# Patient Record
Sex: FEMALE | Race: WHITE | NOT HISPANIC OR LATINO
[De-identification: names, ages, dates, MRNs, and addresses within clinical notes are randomized per-mention and may not be internally consistent; named-entity substitution may affect disease eponyms.]

---

## 2018-03-27 ENCOUNTER — APPOINTMENT (OUTPATIENT)
Dept: HEART AND VASCULAR | Facility: CLINIC | Age: 68
End: 2018-03-27
Payer: MEDICARE

## 2018-03-27 VITALS
DIASTOLIC BLOOD PRESSURE: 78 MMHG | HEART RATE: 77 BPM | OXYGEN SATURATION: 97 % | TEMPERATURE: 97.7 F | HEIGHT: 65 IN | WEIGHT: 148 LBS | BODY MASS INDEX: 24.66 KG/M2 | RESPIRATION RATE: 12 BRPM | SYSTOLIC BLOOD PRESSURE: 114 MMHG

## 2018-03-27 DIAGNOSIS — N95.9 UNSPECIFIED MENOPAUSAL AND PERIMENOPAUSAL DISORDER: ICD-10-CM

## 2018-03-27 DIAGNOSIS — E78.5 HYPERLIPIDEMIA, UNSPECIFIED: ICD-10-CM

## 2018-03-27 DIAGNOSIS — R94.31 ABNORMAL ELECTROCARDIOGRAM [ECG] [EKG]: ICD-10-CM

## 2018-03-27 DIAGNOSIS — Z86.69 PERSONAL HISTORY OF OTHER DISEASES OF THE NERVOUS SYSTEM AND SENSE ORGANS: ICD-10-CM

## 2018-03-27 DIAGNOSIS — I70.90 UNSPECIFIED ATHEROSCLEROSIS: ICD-10-CM

## 2018-03-27 PROCEDURE — 93351 STRESS TTE COMPLETE: CPT

## 2018-03-27 PROCEDURE — 99203 OFFICE O/P NEW LOW 30 MIN: CPT | Mod: 25

## 2018-03-27 PROCEDURE — 93325 DOPPLER ECHO COLOR FLOW MAPG: CPT

## 2018-03-27 PROCEDURE — 93320 DOPPLER ECHO COMPLETE: CPT

## 2018-03-28 PROBLEM — R94.31 ABNORMAL EKG: Status: ACTIVE | Noted: 2018-03-28

## 2018-03-28 PROBLEM — E78.5 DYSLIPIDEMIA: Status: ACTIVE | Noted: 2018-03-28

## 2018-03-28 PROBLEM — N95.9 POSTMENOPAUSAL SYMPTOMS: Status: RESOLVED | Noted: 2018-03-28 | Resolved: 2018-03-28

## 2018-03-28 PROBLEM — I70.90 ATHEROSCLEROSIS: Status: ACTIVE | Noted: 2018-03-28

## 2018-03-28 PROBLEM — Z86.69 HISTORY OF MIGRAINE: Status: RESOLVED | Noted: 2018-03-28 | Resolved: 2018-03-28

## 2018-11-01 ENCOUNTER — OUTPATIENT (OUTPATIENT)
Dept: OUTPATIENT SERVICES | Facility: HOSPITAL | Age: 68
LOS: 1 days | Discharge: HOME | End: 2018-11-01

## 2019-01-17 ENCOUNTER — OUTPATIENT (OUTPATIENT)
Dept: OUTPATIENT SERVICES | Facility: HOSPITAL | Age: 69
LOS: 1 days | Discharge: HOME | End: 2019-01-17

## 2020-06-18 ENCOUNTER — APPOINTMENT (OUTPATIENT)
Dept: OTOLARYNGOLOGY | Facility: CLINIC | Age: 70
End: 2020-06-18
Payer: MEDICARE

## 2020-06-18 VITALS
HEART RATE: 76 BPM | DIASTOLIC BLOOD PRESSURE: 78 MMHG | WEIGHT: 154.2 LBS | BODY MASS INDEX: 25.38 KG/M2 | TEMPERATURE: 95.7 F | HEIGHT: 65.5 IN | SYSTOLIC BLOOD PRESSURE: 124 MMHG

## 2020-06-18 DIAGNOSIS — R06.00 DYSPNEA, UNSPECIFIED: ICD-10-CM

## 2020-06-18 DIAGNOSIS — Z87.19 PERSONAL HISTORY OF OTHER DISEASES OF THE DIGESTIVE SYSTEM: ICD-10-CM

## 2020-06-18 DIAGNOSIS — Z86.79 PERSONAL HISTORY OF OTHER DISEASES OF THE CIRCULATORY SYSTEM: ICD-10-CM

## 2020-06-18 DIAGNOSIS — Z85.828 PERSONAL HISTORY OF OTHER MALIGNANT NEOPLASM OF SKIN: ICD-10-CM

## 2020-06-18 PROCEDURE — 69210 REMOVE IMPACTED EAR WAX UNI: CPT

## 2020-06-18 PROCEDURE — 99203 OFFICE O/P NEW LOW 30 MIN: CPT | Mod: 25

## 2020-06-21 PROBLEM — Z86.79 HISTORY OF ABNORMAL ELECTROCARDIOGRAPHY: Status: RESOLVED | Noted: 2018-03-28 | Resolved: 2020-06-21

## 2020-06-21 PROBLEM — Z87.19 HISTORY OF HEMORRHOIDS: Status: RESOLVED | Noted: 2020-06-21 | Resolved: 2020-06-21

## 2020-06-21 PROBLEM — Z85.828 HISTORY OF BASAL CELL CARCINOMA: Status: RESOLVED | Noted: 2020-06-21 | Resolved: 2020-06-21

## 2020-06-21 PROBLEM — R06.00 DYSPNEA ON EXERTION: Status: RESOLVED | Noted: 2018-03-28 | Resolved: 2020-06-21

## 2020-06-21 RX ORDER — UBIQUINOL 100 MG
CAPSULE ORAL
Refills: 0 | Status: ACTIVE | COMMUNITY

## 2020-06-21 RX ORDER — LUTEIN 6 MG
TABLET ORAL
Refills: 0 | Status: ACTIVE | COMMUNITY

## 2020-06-21 RX ORDER — PSYLLIUM HUSK 0.4 G
CAPSULE ORAL
Refills: 0 | Status: ACTIVE | COMMUNITY

## 2020-06-21 RX ORDER — CHROMIUM 200 MCG
TABLET ORAL
Refills: 0 | Status: ACTIVE | COMMUNITY

## 2020-06-22 NOTE — DATA REVIEWED
[de-identified] : \par AUDIOGRAM (02/26/2020)\par RIGHT:  Mild to moderately severe SNHL in mid-high frequencies\par LEFT:   Mild to severe  SNHL  in mid-high frequencies\par Word recognition 100% on right; 02% on left\par \par OAEs:  present            absent         Right        Left

## 2020-06-22 NOTE — HISTORY OF PRESENT ILLNESS
[de-identified] : Ms. Lemus is a 70 year old woman who was referred by Dr. Jensen for attention to the ears.\par She was accompanied by her , who also contributed to history. \par \par She is concerned for cerumen in ears due to clogging sensation and problem with hearing aids.\par She obtained hearing aids in February at [facility] and is satisfied with use at this time.  However, the domes keep coming off when she takes the hearing aids out.  She thinks something may be in her ears.\par No tinnitus, vertigo, ear pain or ear d/c.\par \par

## 2020-06-22 NOTE — CONSULT LETTER
[Dear  ___] : Dear  [unfilled], [( Thank you for referring [unfilled] for consultation for _____ )] : Thank you for referring [unfilled] for consultation for [unfilled] [Please see my note below.] : Please see my note below. [Consult Closing:] : Thank you very much for allowing me to participate in the care of this patient.  If you have any questions, please do not hesitate to contact me. [Sincerely,] : Sincerely, [FreeTextEntry2] : Rakesh Jensen MD\par [address], [address]\par [place] [id number] [FreeTextEntry3] : \par Becky Landa MD \par Otolaryngology, Head and Neck Surgery \par \par   [FreeTextEntry1] : \par Enclosed please find my office notes for June 18, 2020. \par

## 2020-06-22 NOTE — REVIEW OF SYSTEMS
[Patient Intake Form Reviewed] : Patient intake form was reviewed [Seasonal Allergies] : seasonal allergies [As Noted in HPI] : as noted in HPI [Joint Pain] : joint pain [Negative] : Heme/Lymph [de-identified] : dust, cat [FreeTextEntry2] : night sseats [FreeTextEntry9] : back pain

## 2020-06-22 NOTE — PHYSICAL EXAM
[Midline] : trachea located in midline position [Removed] : palatine tonsils previously removed [Normal] : no rashes [de-identified] : Excess cerumen removed with curette bilateral.  No erythema, exudate or FB noted. [de-identified] : S/p Nasal tip reconstruction w flap.  no lesion. [de-identified] : Carotid pulses 2+ bilateral.

## 2020-06-22 NOTE — ASSESSMENT
[FreeTextEntry1] : Ms. Lemus had ear clogging, relieved with cerumen removal bilateral.\par No FB was noted in either ear.\par I suggested that she speak with her audiologist re the domes, which may need to be changed to different size/style.\par \par Return in 1 year or PRN.\par

## 2021-03-31 ENCOUNTER — APPOINTMENT (OUTPATIENT)
Dept: OTOLARYNGOLOGY | Facility: CLINIC | Age: 71
End: 2021-03-31
Payer: MEDICARE

## 2021-03-31 VITALS
SYSTOLIC BLOOD PRESSURE: 137 MMHG | DIASTOLIC BLOOD PRESSURE: 73 MMHG | HEIGHT: 65.5 IN | TEMPERATURE: 96.2 F | BODY MASS INDEX: 25.02 KG/M2 | HEART RATE: 80 BPM | WEIGHT: 152 LBS

## 2021-03-31 DIAGNOSIS — H93.8X3 OTHER SPECIFIED DISORDERS OF EAR, BILATERAL: ICD-10-CM

## 2021-03-31 PROCEDURE — 69200 CLEAR OUTER EAR CANAL: CPT | Mod: LT

## 2021-03-31 PROCEDURE — 99213 OFFICE O/P EST LOW 20 MIN: CPT | Mod: 25

## 2021-03-31 NOTE — HISTORY OF PRESENT ILLNESS
[de-identified] : Ms. Lemus was seen in f/up for hearing and wax.\par She was accompanied by her . \par \par About 4 weeks ago, 3 "sizeable globs" of wax came out of right ear.\par No ear discomfort or problem with her hearing aids.\par No tinnitus, vertigo.\par \par \par S/p 2 shots COVIC vaccine over 2 weeks ago.

## 2021-03-31 NOTE — CONSULT LETTER
[Dear  ___] : Dear  [unfilled], [Courtesy Letter:] : I had the pleasure of seeing your patient, [unfilled], in my office today. [Please see my note below.] : Please see my note below. [Consult Closing:] : Thank you very much for allowing me to participate in the care of this patient.  If you have any questions, please do not hesitate to contact me. [Sincerely,] : Sincerely, [FreeTextEntry2] : Rakesh Jensen MD\par [address], [address]\par [place] [id number] [FreeTextEntry3] : \par Becky Landa MD \par Otolaryngology, Head and Neck Surgery \par \par

## 2021-03-31 NOTE — PHYSICAL EXAM
[de-identified] : Right excess cerumen removed.  Left EAC collapsing canal; cerumen and FB (small dome for hearing aid) were removed with curette. [de-identified] : S/p Nasal tip reconstruction w flap.  no lesion. [Midline] : trachea located in midline position [Removed] : palatine tonsils previously removed [Normal] : no neck adenopathy

## 2021-03-31 NOTE — DATA REVIEWED
[de-identified] : \par AUDIOGRAM (02/26/2020)\par RIGHT:  Mild to moderately severe SNHL in mid-high frequencies\par LEFT:   Mild to severe  SNHL  in mid-high frequencies\par Word recognition 100% on right; 02% on left\par

## 2021-03-31 NOTE — ASSESSMENT
[FreeTextEntry1] : Ms. Lemus had cerumen removal bilaterally.\par A FB (small dome for her hearing aid) was removed from left EAC.  \par She feels that hearing is better now.\par \par She is to look at the hearing aids when she takes them out - check that the domes are attached.\par \par Return in 6-9 months\par

## 2021-10-05 ENCOUNTER — APPOINTMENT (OUTPATIENT)
Dept: OTOLARYNGOLOGY | Facility: HOSPITAL | Age: 71
End: 2021-10-05

## 2021-11-10 ENCOUNTER — APPOINTMENT (OUTPATIENT)
Dept: OTOLARYNGOLOGY | Facility: CLINIC | Age: 71
End: 2021-11-10
Payer: MEDICARE

## 2021-11-10 VITALS
HEART RATE: 78 BPM | BODY MASS INDEX: 25.33 KG/M2 | SYSTOLIC BLOOD PRESSURE: 138 MMHG | TEMPERATURE: 97 F | DIASTOLIC BLOOD PRESSURE: 62 MMHG | HEIGHT: 65 IN | WEIGHT: 152 LBS

## 2021-11-10 DIAGNOSIS — Z92.29 PERSONAL HISTORY OF OTHER DRUG THERAPY: ICD-10-CM

## 2021-11-10 DIAGNOSIS — T16.2XXA FOREIGN BODY IN LEFT EAR, INITIAL ENCOUNTER: ICD-10-CM

## 2021-11-10 PROCEDURE — 99213 OFFICE O/P EST LOW 20 MIN: CPT | Mod: 25

## 2021-11-10 PROCEDURE — 69210 REMOVE IMPACTED EAR WAX UNI: CPT

## 2021-12-29 PROBLEM — T16.2XXA EAR FOREIGN BODY, LEFT, INITIAL ENCOUNTER: Status: RESOLVED | Noted: 2021-03-31 | Resolved: 2021-12-29

## 2021-12-29 NOTE — CONSULT LETTER
[Dear  ___] : Dear  [unfilled], [Courtesy Letter:] : I had the pleasure of seeing your patient, [unfilled], in my office today. [Please see my note below.] : Please see my note below. [Consult Closing:] : Thank you very much for allowing me to participate in the care of this patient.  If you have any questions, please do not hesitate to contact me. [Sincerely,] : Sincerely, [FreeTextEntry2] : Rakesh Jensen MD\par [address], [address]\par [place] [phone] [FreeTextEntry3] : \par Becky Landa MD \par Otolaryngology, Head and Neck Surgery \par \par

## 2021-12-29 NOTE — ASSESSMENT
[FreeTextEntry1] : Ms. Lemus had relief of ear clogging afer cerumen removal bilaterally.\par No FB was seen in either ear.\par She feels that hearing is better now.\par \par Return in 6-9 months\par

## 2021-12-29 NOTE — HISTORY OF PRESENT ILLNESS
[de-identified] : Ms. Lemus was seen in f/up for hearing loss and wax.\par She was accompanied by her . \par \par One of her hearing aids is being repaired. She lost the dome from her left ear and is concerned that it remains in her ear., as has happened in the past.\par No ear pain or drainage.\par \par VISIT 3/31/2021\par About 4 weeks ago, 3 "sizeable globs" of wax came out of right ear.\par No ear discomfort or problem with her hearing aids.\par No tinnitus, vertigo.\par \par

## 2021-12-29 NOTE — PHYSICAL EXAM
[Midline] : trachea located in midline position [Removed] : palatine tonsils previously removed [FreeTextEntry1] : No hoarseness.  [de-identified] : Bilateral cerumen impactions were removed with curette.  NO FB seen in either ear. [de-identified] : S/p Nasal dome reconstruction w flap.  No lesion. [Normal] : no neck adenopathy

## 2021-12-29 NOTE — DATA REVIEWED
[de-identified] : \par AUDIOGRAM (02/26/2020) at [facility]\par RIGHT:  Mild to moderately severe SNHL in mid-high frequencies\par LEFT:   Mild to severe  SNHL  in mid-high frequencies\par Word recognition 100% on right; 92% on left\par

## 2022-07-20 ENCOUNTER — APPOINTMENT (OUTPATIENT)
Dept: OTOLARYNGOLOGY | Facility: CLINIC | Age: 72
End: 2022-07-20

## 2022-07-20 VITALS
HEART RATE: 70 BPM | TEMPERATURE: 97 F | BODY MASS INDEX: 23.3 KG/M2 | SYSTOLIC BLOOD PRESSURE: 128 MMHG | WEIGHT: 145 LBS | DIASTOLIC BLOOD PRESSURE: 88 MMHG | HEIGHT: 66 IN

## 2022-07-20 DIAGNOSIS — H93.12 TINNITUS, LEFT EAR: ICD-10-CM

## 2022-07-20 DIAGNOSIS — H61.23 IMPACTED CERUMEN, BILATERAL: ICD-10-CM

## 2022-07-20 PROCEDURE — 69210 REMOVE IMPACTED EAR WAX UNI: CPT | Mod: LT

## 2022-07-20 PROCEDURE — 99213 OFFICE O/P EST LOW 20 MIN: CPT | Mod: 25

## 2022-07-26 PROBLEM — H61.23 EXCESSIVE CERUMEN IN BOTH EAR CANALS: Status: RESOLVED | Noted: 2020-06-22 | Resolved: 2022-07-26

## 2022-07-26 PROBLEM — H93.12 TINNITUS, LEFT: Status: ACTIVE | Noted: 2022-07-26

## 2022-07-26 NOTE — HISTORY OF PRESENT ILLNESS
[de-identified] : Ms. Lemus feels both of her ears (L>R) are clogged. \par The left ear is giving a buzzing noise that she thinks is due to the wax.\par No ear pain or drainage.  No vertigo.\par Wearing hearing aids and happy with them\par \par VISIT 11/10/2021\par One of her hearing aids is being repaired. She lost the dome from her left ear and is concerned that it remains in her ear., as has happened in the past.\par No ear pain or drainage.\par \par VISIT 3/31/2021\par About 4 weeks ago, 3 "sizeable globs" of wax came out of right ear.\par No ear discomfort or problem with her hearing aids.\par No tinnitus, vertigo.\par

## 2022-07-26 NOTE — CONSULT LETTER
[Dear  ___] : Dear  [unfilled], [Courtesy Letter:] : I had the pleasure of seeing your patient, [unfilled], in my office today. [Please see my note below.] : Please see my note below. [Consult Closing:] : Thank you very much for allowing me to participate in the care of this patient.  If you have any questions, please do not hesitate to contact me. [Sincerely,] : Sincerely, [FreeTextEntry3] : \par Becky Landa MD \par Otolaryngology, Head and Neck Surgery \par \par   [FreeTextEntry2] : Rakesh Jensen MD\par [address], [address]\par [place] [id number]

## 2022-07-26 NOTE — PHYSICAL EXAM
[Midline] : trachea located in midline position [Removed] : palatine tonsils previously removed [Normal] : no neck adenopathy [FreeTextEntry1] : No hoarseness.  [de-identified] : Left EAC with cerumen impaction, removed with curette and suction after H2O2 instillation. Right EAC clear.\par \par   [de-identified] : S/p nasal dome reconstruction w flap; wellhealed.  No lesion noted on skin.

## 2022-07-26 NOTE — ASSESSMENT
[FreeTextEntry1] : Ms. Lemus had relief of ear clogging and tinnitus after left cerumen impaction was removed.\par She should clean the hearing aid of wax regularly\par \par Return in 6-9 months

## 2023-02-09 ENCOUNTER — APPOINTMENT (OUTPATIENT)
Dept: OTOLARYNGOLOGY | Facility: CLINIC | Age: 73
End: 2023-02-09
Payer: MEDICARE

## 2023-02-09 VITALS
HEIGHT: 66 IN | SYSTOLIC BLOOD PRESSURE: 140 MMHG | HEART RATE: 99 BPM | DIASTOLIC BLOOD PRESSURE: 80 MMHG | WEIGHT: 159.2 LBS | BODY MASS INDEX: 25.58 KG/M2 | TEMPERATURE: 96.3 F

## 2023-02-09 DIAGNOSIS — Z97.4 PRESENCE OF EXTERNAL HEARING-AID: ICD-10-CM

## 2023-02-09 PROCEDURE — 69210 REMOVE IMPACTED EAR WAX UNI: CPT

## 2023-02-09 PROCEDURE — 99213 OFFICE O/P EST LOW 20 MIN: CPT | Mod: 25

## 2023-02-17 ENCOUNTER — OFFICE (OUTPATIENT)
Dept: URBAN - METROPOLITAN AREA CLINIC 28 | Facility: CLINIC | Age: 73
Setting detail: OPHTHALMOLOGY
End: 2023-02-17
Payer: MEDICARE

## 2023-02-17 DIAGNOSIS — H01.001: ICD-10-CM

## 2023-02-17 DIAGNOSIS — H40.1131: ICD-10-CM

## 2023-02-17 DIAGNOSIS — H01.005: ICD-10-CM

## 2023-02-17 DIAGNOSIS — H35.3132: ICD-10-CM

## 2023-02-17 DIAGNOSIS — H01.004: ICD-10-CM

## 2023-02-17 DIAGNOSIS — H02.89: ICD-10-CM

## 2023-02-17 DIAGNOSIS — H43.393: ICD-10-CM

## 2023-02-17 DIAGNOSIS — H01.002: ICD-10-CM

## 2023-02-17 DIAGNOSIS — H26.493: ICD-10-CM

## 2023-02-17 PROCEDURE — 92250 FUNDUS PHOTOGRAPHY W/I&R: CPT | Performed by: OPHTHALMOLOGY

## 2023-02-17 PROCEDURE — 92012 INTRM OPH EXAM EST PATIENT: CPT | Performed by: OPHTHALMOLOGY

## 2023-02-17 ASSESSMENT — REFRACTION_CURRENTRX
OS_CYLINDER: 0.00
OS_AXIS: 180
OD_AXIS: 180
OS_OVR_VA: 20/
OD_SPHERE: +1.00
OS_SPHERE: +1.25
OD_OVR_VA: 20/
OD_CYLINDER: 0.00

## 2023-02-17 ASSESSMENT — KERATOMETRY
OD_AXISANGLE_DEGREES: 061
OS_K1POWER_DIOPTERS: 44.50
OS_K2POWER_DIOPTERS: 45.50
OD_K2POWER_DIOPTERS: 44.50
OD_K1POWER_DIOPTERS: 44.25
OS_AXISANGLE_DEGREES: 090

## 2023-02-17 ASSESSMENT — REFRACTION_AUTOREFRACTION
OD_AXIS: 100
OS_AXIS: 022
OD_CYLINDER: -0.75
OS_CYLINDER: -0.75
OD_SPHERE: -0.25
OS_SPHERE: +0.25

## 2023-02-17 ASSESSMENT — PACHYMETRY
OS_CT_CORRECTION: -1
OD_CT_CORRECTION: -1
OS_CT_UM: 562
OD_CT_UM: 561

## 2023-02-17 ASSESSMENT — TONOMETRY
OS_IOP_MMHG: 15
OD_IOP_MMHG: 15

## 2023-02-17 ASSESSMENT — CONFRONTATIONAL VISUAL FIELD TEST (CVF)
OS_FINDINGS: FULL
OD_FINDINGS: FULL

## 2023-02-17 ASSESSMENT — VISUAL ACUITY
OS_BCVA: 20/25+2
OD_BCVA: 20/20-2

## 2023-02-17 ASSESSMENT — AXIALLENGTH_DERIVED
OD_AL: 23.5143
OS_AL: 23.1001

## 2023-02-17 ASSESSMENT — LID EXAM ASSESSMENTS
OD_MEIBOMITIS: 2+ 3+
OS_COMMENTS: LID MARGIN TELANGIECTASIA
OD_COMMENTS: LID MARGIN TELANGIECTASIA
OS_MEIBOMITIS: 2+ 3+
OS_BLEPHARITIS: 2+ 3+
OD_BLEPHARITIS: 2+ 3+

## 2023-02-17 ASSESSMENT — SPHEQUIV_DERIVED
OD_SPHEQUIV: -0.625
OS_SPHEQUIV: -0.125

## 2023-06-29 PROBLEM — Z97.4 WEARS HEARING AID IN BOTH EARS: Status: ACTIVE | Noted: 2020-06-22

## 2023-06-29 NOTE — PHYSICAL EXAM
[Midline] : trachea located in midline position [Removed] : palatine tonsils previously removed [FreeTextEntry1] : No hoarseness.  [de-identified] : bilateral cerumen impactions, removed with curette  [de-identified] : S/p Nasal tip reconstruction w flap.  no lesion. [Normal] : no neck adenopathy

## 2023-06-29 NOTE — ASSESSMENT
[FreeTextEntry1] : Ms. Lemus had relief of ear clogging after bilateral cerumen impaction was removed.\par She should clean the hearing aids of wax regularly\par \par Return in 6-9 months. \par

## 2023-06-29 NOTE — HISTORY OF PRESENT ILLNESS
[de-identified] : Ms. Lemus feels both her ears are clogged.\par No ear pain or drainage. No vertigo.\par Wearing hearing aids and happy with them\par \par VISIT 7/20/2022\par Ms. Lemus feels both of her ears (L>R) are clogged. \par The left ear is giving a buzzing noise that she thinks is due to the wax.\par No ear pain or drainage. No vertigo.\par Wearing hearing aids and happy with them\par \par VISIT 11/10/2021\par One of her hearing aids is being repaired. She lost the dome from her left ear and is concerned that it remains in her ear., as has happened in the past.\par No ear pain or drainage.\par \par VISIT 3/31/2021\par About 4 weeks ago, 3 "sizeable globs" of wax came out of right ear.\par No ear discomfort or problem with her hearing aids.\par No tinnitus, vertigo.\par

## 2023-06-29 NOTE — DATA REVIEWED
[de-identified] : \par AUDIOGRAM (02/26/2020)\par RIGHT:  Mild to moderately severe SNHL in mid-high frequencies\par LEFT:   Mild to severe  SNHL  in mid-high frequencies\par Word recognition 100% on right; 02% on left\par \par OAEs:  present            absent         Right        Left

## 2023-06-29 NOTE — CONSULT LETTER
[Dear  ___] : Dear  [unfilled], [Courtesy Letter:] : I had the pleasure of seeing your patient, [unfilled], in my office today. [Please see my note below.] : Please see my note below. [Consult Closing:] : Thank you very much for allowing me to participate in the care of this patient.  If you have any questions, please do not hesitate to contact me. [Sincerely,] : Sincerely, [FreeTextEntry2] : Rakesh Jensen MD\par [address]\par [place] [phone] [FreeTextEntry3] : \par Becky Landa MD \par Otolaryngology, Head and Neck Surgery \par \par

## 2023-08-10 ENCOUNTER — APPOINTMENT (OUTPATIENT)
Dept: OTOLARYNGOLOGY | Facility: CLINIC | Age: 73
End: 2023-08-10
Payer: MEDICARE

## 2023-08-10 VITALS
HEART RATE: 85 BPM | TEMPERATURE: 96.8 F | BODY MASS INDEX: 26.03 KG/M2 | WEIGHT: 162 LBS | HEIGHT: 66 IN | SYSTOLIC BLOOD PRESSURE: 136 MMHG | DIASTOLIC BLOOD PRESSURE: 78 MMHG

## 2023-08-10 DIAGNOSIS — H93.8X2 OTHER SPECIFIED DISORDERS OF LEFT EAR: ICD-10-CM

## 2023-08-10 PROCEDURE — 99213 OFFICE O/P EST LOW 20 MIN: CPT | Mod: 25

## 2023-08-10 PROCEDURE — 69210 REMOVE IMPACTED EAR WAX UNI: CPT | Mod: LT

## 2023-08-10 RX ORDER — TIMOLOL MALEATE 2.5 MG/ML
0.25 SOLUTION/ DROPS OPHTHALMIC
Refills: 0 | Status: COMPLETED | COMMUNITY
End: 2023-08-10

## 2023-08-11 PROBLEM — H93.8X2 SENSATION OF PLUGGED EAR ON LEFT SIDE: Status: RESOLVED | Noted: 2022-07-20 | Resolved: 2023-08-11

## 2023-08-11 RX ORDER — PNV NO.95/FERROUS FUM/FOLIC AC 28MG-0.8MG
100 TABLET ORAL
Refills: 0 | Status: ACTIVE | COMMUNITY

## 2023-08-11 RX ORDER — LATANOPROST/PF 0.005 %
0.01 DROPS OPHTHALMIC (EYE)
Refills: 0 | Status: ACTIVE | COMMUNITY

## 2023-08-11 RX ORDER — CYCLOBENZAPRINE HYDROCHLORIDE 5 MG/1
5 TABLET, FILM COATED ORAL
Qty: 56 | Refills: 0 | Status: ACTIVE | COMMUNITY
Start: 2023-02-12

## 2023-08-11 RX ORDER — DICLOFENAC EPOLAMINE 0.01 G/1
1.3 SYSTEM TOPICAL
Qty: 60 | Refills: 0 | Status: ACTIVE | COMMUNITY
Start: 2023-02-15

## 2023-08-11 NOTE — ASSESSMENT
[FreeTextEntry1] : Ms. Lemus had relief of ear clogging after left-sided cerumen was removed. Bilateral sensorineural hearing loss.  She has stopped wearing her hearing aids for a while now.  Not so happy with [facility] - may consider transfer of care to our audiologists.  Return in 6-9 months.

## 2023-08-11 NOTE — PHYSICAL EXAM
[FreeTextEntry1] : No hoarseness.  [de-identified] : Excessive cerumen removed from left EAC with curette.  Right EAC scant cerumen. [de-identified] : S/p nasal dome reconstruction w flap more to left side; wellhealed.  No lesion noted on skin. [Midline] : trachea located in midline position [Removed] : palatine tonsils previously removed [Normal] : no neck adenopathy

## 2023-08-11 NOTE — DATA REVIEWED
[de-identified] :  AUDIOGRAM (02/26/2020) RIGHT: Mild to moderately severe SNHL in mid-high frequencies LEFT: Mild to severe SNHL in mid-high frequencies Word recognition 100% on right; 92% on left

## 2023-08-11 NOTE — HISTORY OF PRESENT ILLNESS
[de-identified] : Ms. LEMUS has some ear clogging.   No changes in hearing.  She stopped wearing her hearing aids.    VISIT 02/09/2023  Ms. Lemus feels both her ears are clogged.  No ear pain or drainage. No vertigo. Wearing hearing aids and happy with them  VISIT 7/20/2022 Ms. Lemus feels both of her ears (L>R) are clogged. The left ear is giving a buzzing noise that she thinks is due to the wax. No ear pain or drainage. No vertigo. Wearing hearing aids and happy with them  VISIT 11/10/2021 One of her hearing aids is being repaired. She lost the dome from her left hearing aide and is concerned that it remains in her ear, as has happened in the past. No ear pain or drainage.  VISIT 3/31/2021 About 4 weeks ago, 3 "sizeable globs" of wax came out of right ear. No ear discomfort or problem with her hearing aids.  No tinnitus, vertigo.  INITIAL VISIT 6/18/2020 Ms. Lemus is a 70 year old woman who was referred by Dr. Jensen for attention to the ears. She was accompanied by her , who also contributed to history. She is concerned for cerumen in ears due to clogging sensation and problem with hearing aids. She obtained hearing aids in February at [facility] and is satisfied with use at this time. However, the domes keep coming off when she takes the hearing aids out. She thinks something may be in her ears. No tinnitus, vertigo, ear pain or ear d/c.

## 2023-08-11 NOTE — CONSULT LETTER
[Dear  ___] : Dear  [unfilled], [Courtesy Letter:] : I had the pleasure of seeing your patient, [unfilled], in my office today. [Please see my note below.] : Please see my note below. [Sincerely,] : Sincerely, [Consult Closing:] : Thank you very much for allowing me to participate in the care of this patient.  If you have any questions, please do not hesitate to contact me. [FreeTextEntry2] : Rakesh Jensen MD\par  [address], [address]\par  [place] [id number] [FreeTextEntry3] : \par  Becky Landa MD \par  Otolaryngology, Head and Neck Surgery \par  \par

## 2023-08-31 ENCOUNTER — OFFICE (OUTPATIENT)
Dept: URBAN - METROPOLITAN AREA CLINIC 28 | Facility: CLINIC | Age: 73
Setting detail: OPHTHALMOLOGY
End: 2023-08-31
Payer: MEDICARE

## 2023-08-31 DIAGNOSIS — H02.89: ICD-10-CM

## 2023-08-31 DIAGNOSIS — H40.1131: ICD-10-CM

## 2023-08-31 DIAGNOSIS — H26.493: ICD-10-CM

## 2023-08-31 DIAGNOSIS — H01.004: ICD-10-CM

## 2023-08-31 DIAGNOSIS — H35.3132: ICD-10-CM

## 2023-08-31 DIAGNOSIS — H01.001: ICD-10-CM

## 2023-08-31 DIAGNOSIS — H43.393: ICD-10-CM

## 2023-08-31 DIAGNOSIS — H01.002: ICD-10-CM

## 2023-08-31 DIAGNOSIS — H01.005: ICD-10-CM

## 2023-08-31 PROCEDURE — 92012 INTRM OPH EXAM EST PATIENT: CPT | Performed by: OPHTHALMOLOGY

## 2023-08-31 PROCEDURE — 92250 FUNDUS PHOTOGRAPHY W/I&R: CPT | Performed by: OPHTHALMOLOGY

## 2023-08-31 ASSESSMENT — LID EXAM ASSESSMENTS
OS_MEIBOMITIS: 2+ 3+
OD_COMMENTS: LID MARGIN TELANGIECTASIA
OS_COMMENTS: LID MARGIN TELANGIECTASIA
OD_BLEPHARITIS: 2+ 3+
OS_BLEPHARITIS: 2+ 3+
OD_MEIBOMITIS: 2+ 3+

## 2023-08-31 ASSESSMENT — PACHYMETRY
OD_CT_CORRECTION: -1
OS_CT_CORRECTION: -1
OD_CT_UM: 561
OS_CT_UM: 562

## 2023-08-31 ASSESSMENT — TONOMETRY
OS_IOP_MMHG: 14
OD_IOP_MMHG: 15

## 2023-08-31 ASSESSMENT — SPHEQUIV_DERIVED
OS_SPHEQUIV: 0.25
OD_SPHEQUIV: -0.625

## 2023-08-31 ASSESSMENT — REFRACTION_AUTOREFRACTION
OD_AXIS: 107
OS_SPHERE: 0.00
OS_CYLINDER: +0.50
OS_AXIS: 035
OD_CYLINDER: -0.75
OD_SPHERE: -0.25

## 2023-08-31 ASSESSMENT — AXIALLENGTH_DERIVED
OD_AL: 23.5143
OS_AL: 22.8742

## 2023-08-31 ASSESSMENT — CONFRONTATIONAL VISUAL FIELD TEST (CVF)
OD_FINDINGS: FULL
OS_FINDINGS: FULL

## 2023-08-31 ASSESSMENT — VISUAL ACUITY
OD_BCVA: 20/20
OS_BCVA: 20/25-2

## 2024-02-14 ENCOUNTER — APPOINTMENT (OUTPATIENT)
Dept: OTOLARYNGOLOGY | Facility: CLINIC | Age: 74
End: 2024-02-14
Payer: MEDICARE

## 2024-02-14 VITALS
HEIGHT: 66 IN | DIASTOLIC BLOOD PRESSURE: 71 MMHG | BODY MASS INDEX: 26.03 KG/M2 | HEART RATE: 69 BPM | WEIGHT: 162 LBS | SYSTOLIC BLOOD PRESSURE: 130 MMHG

## 2024-02-14 DIAGNOSIS — H61.22 IMPACTED CERUMEN, LEFT EAR: ICD-10-CM

## 2024-02-14 DIAGNOSIS — H61.23 IMPACTED CERUMEN, BILATERAL: ICD-10-CM

## 2024-02-14 DIAGNOSIS — H90.3 SENSORINEURAL HEARING LOSS, BILATERAL: ICD-10-CM

## 2024-02-14 DIAGNOSIS — H93.8X3 OTHER SPECIFIED DISORDERS OF EAR, BILATERAL: ICD-10-CM

## 2024-02-14 PROCEDURE — 99212 OFFICE O/P EST SF 10 MIN: CPT | Mod: 25

## 2024-02-14 PROCEDURE — 69210 REMOVE IMPACTED EAR WAX UNI: CPT

## 2024-03-05 ENCOUNTER — OFFICE (OUTPATIENT)
Dept: URBAN - METROPOLITAN AREA CLINIC 28 | Facility: CLINIC | Age: 74
Setting detail: OPHTHALMOLOGY
End: 2024-03-05
Payer: MEDICARE

## 2024-03-05 DIAGNOSIS — H26.493: ICD-10-CM

## 2024-03-05 DIAGNOSIS — H01.002: ICD-10-CM

## 2024-03-05 DIAGNOSIS — H40.1131: ICD-10-CM

## 2024-03-05 DIAGNOSIS — H01.001: ICD-10-CM

## 2024-03-05 DIAGNOSIS — H02.89: ICD-10-CM

## 2024-03-05 DIAGNOSIS — H35.3132: ICD-10-CM

## 2024-03-05 DIAGNOSIS — H43.393: ICD-10-CM

## 2024-03-05 DIAGNOSIS — H01.005: ICD-10-CM

## 2024-03-05 DIAGNOSIS — H01.004: ICD-10-CM

## 2024-03-05 PROCEDURE — 92134 CPTRZ OPH DX IMG PST SGM RTA: CPT | Performed by: OPHTHALMOLOGY

## 2024-03-05 PROCEDURE — 92012 INTRM OPH EXAM EST PATIENT: CPT | Performed by: OPHTHALMOLOGY

## 2024-03-05 ASSESSMENT — LID EXAM ASSESSMENTS
OS_MEIBOMITIS: 2+ 3+
OD_BLEPHARITIS: 2+ 3+
OD_MEIBOMITIS: 2+ 3+
OS_BLEPHARITIS: 2+ 3+
OS_COMMENTS: LID MARGIN TELANGIECTASIA
OD_COMMENTS: LID MARGIN TELANGIECTASIA

## 2024-03-05 ASSESSMENT — REFRACTION_CURRENTRX
OS_VPRISM_DIRECTION: SV
OS_AXIS: 180
OD_AXIS: 180
OD_OVR_VA: 20/
OS_CYLINDER: 0.00
OD_VPRISM_DIRECTION: SV
OD_OVR_VA: 20/
OS_OVR_VA: 20/
OS_AXIS: 180
OS_VPRISM_DIRECTION: SV
OS_CYLINDER: 0.00
OD_VPRISM_DIRECTION: SV
OD_SPHERE: +1.00
OS_SPHERE: +1.00
OD_CYLINDER: 0.00
OS_OVR_VA: 20/
OD_AXIS: 180
OD_CYLINDER: 0.00
OS_SPHERE: +1.00
OD_SPHERE: +1.00

## 2024-03-05 ASSESSMENT — KERATOMETRY
OD_K1POWER_DIOPTERS: 44.25
OS_K1POWER_DIOPTERS: 45.00
OS_K2POWER_DIOPTERS: 45.50
OS_AXISANGLE_DEGREES: 090
OD_K2POWER_DIOPTERS: 44.50
OD_AXISANGLE_DEGREES: 074

## 2024-04-30 PROBLEM — H61.22 EXCESSIVE CERUMEN IN LEFT EAR CANAL: Status: RESOLVED | Noted: 2022-07-20 | Resolved: 2024-04-30

## 2024-04-30 PROBLEM — H90.3 SENSORINEURAL HEARING LOSS (SNHL) OF BOTH EARS: Status: ACTIVE | Noted: 2021-12-29

## 2024-04-30 PROBLEM — H61.23 BILATERAL IMPACTED CERUMEN: Status: ACTIVE | Noted: 2023-02-09

## 2024-04-30 PROBLEM — H93.8X3 SENSATION OF PLUGGED EAR ON BOTH SIDES: Status: ACTIVE | Noted: 2023-02-09

## 2024-04-30 NOTE — PHYSICAL EXAM
[FreeTextEntry1] : No hoarseness.  [de-identified] : Bilateral cerumen impactions were removed with curette. [de-identified] : S/p nasal dome reconstruction w flap more to left side;  [Removed] : palatine tonsils previously removed [Normal] : no neck adenopathy

## 2024-04-30 NOTE — ASSESSMENT
[FreeTextEntry1] : Ms. Lemus had relief of ear clogging after bilateral cerumen was removed. Bilateral sensorineural hearing loss.    Return in 6-9 months.

## 2024-04-30 NOTE — DATA REVIEWED
[de-identified] :  AUDIOGRAM (02/26/2020) RIGHT: Mild to moderately severe SNHL in mid-high frequencies LEFT: Mild to severe SNHL in mid-high frequencies Word recognition 100% on right; 92% on left

## 2024-08-14 ENCOUNTER — APPOINTMENT (OUTPATIENT)
Dept: OTOLARYNGOLOGY | Facility: CLINIC | Age: 74
End: 2024-08-14

## 2024-08-14 VITALS
TEMPERATURE: 97.3 F | HEART RATE: 104 BPM | HEIGHT: 67 IN | WEIGHT: 145 LBS | DIASTOLIC BLOOD PRESSURE: 76 MMHG | SYSTOLIC BLOOD PRESSURE: 138 MMHG | BODY MASS INDEX: 22.76 KG/M2

## 2024-08-14 PROCEDURE — 99212 OFFICE O/P EST SF 10 MIN: CPT | Mod: 25

## 2024-08-14 PROCEDURE — 69210 REMOVE IMPACTED EAR WAX UNI: CPT

## 2024-09-03 ENCOUNTER — APPOINTMENT (OUTPATIENT)
Dept: OTOLARYNGOLOGY | Facility: CLINIC | Age: 74
End: 2024-09-03
Payer: MEDICARE

## 2024-09-03 ENCOUNTER — APPOINTMENT (OUTPATIENT)
Dept: OTOLARYNGOLOGY | Facility: CLINIC | Age: 74
End: 2024-09-03
Payer: SELF-PAY

## 2024-09-03 PROCEDURE — 92550 TYMPANOMETRY & REFLEX THRESH: CPT | Mod: 52

## 2024-09-03 PROCEDURE — 92557 COMPREHENSIVE HEARING TEST: CPT

## 2024-09-03 PROCEDURE — V5011: CPT

## 2024-09-19 ENCOUNTER — OFFICE (OUTPATIENT)
Dept: URBAN - METROPOLITAN AREA CLINIC 28 | Facility: CLINIC | Age: 74
Setting detail: OPHTHALMOLOGY
End: 2024-09-19
Payer: MEDICARE

## 2024-09-19 DIAGNOSIS — H01.005: ICD-10-CM

## 2024-09-19 DIAGNOSIS — H40.1131: ICD-10-CM

## 2024-09-19 DIAGNOSIS — H35.3132: ICD-10-CM

## 2024-09-19 DIAGNOSIS — H43.393: ICD-10-CM

## 2024-09-19 DIAGNOSIS — H01.001: ICD-10-CM

## 2024-09-19 DIAGNOSIS — H26.493: ICD-10-CM

## 2024-09-19 DIAGNOSIS — H01.002: ICD-10-CM

## 2024-09-19 DIAGNOSIS — H01.004: ICD-10-CM

## 2024-09-19 DIAGNOSIS — H02.89: ICD-10-CM

## 2024-09-19 PROCEDURE — 92083 EXTENDED VISUAL FIELD XM: CPT | Performed by: OPHTHALMOLOGY

## 2024-09-19 PROCEDURE — 92250 FUNDUS PHOTOGRAPHY W/I&R: CPT | Performed by: OPHTHALMOLOGY

## 2024-09-19 PROCEDURE — 92014 COMPRE OPH EXAM EST PT 1/>: CPT | Performed by: OPHTHALMOLOGY

## 2024-09-19 ASSESSMENT — LID EXAM ASSESSMENTS
OS_MEIBOMITIS: 2+ 3+
OS_COMMENTS: LID MARGIN TELANGIECTASIA
OD_MEIBOMITIS: 2+ 3+
OD_COMMENTS: LID MARGIN TELANGIECTASIA
OS_BLEPHARITIS: 2+ 3+
OD_BLEPHARITIS: 2+ 3+

## 2024-09-19 ASSESSMENT — REFRACTION_CURRENTRX
OD_VPRISM_DIRECTION: SV
OS_VPRISM_DIRECTION: SV
OS_CYLINDER: 0.00
OD_SPHERE: +1.00
OD_AXIS: 180
OS_SPHERE: +1.00
OS_AXIS: 180
OD_CYLINDER: 0.00
OD_OVR_VA: 20/
OS_OVR_VA: 20/

## 2024-09-19 ASSESSMENT — CONFRONTATIONAL VISUAL FIELD TEST (CVF)
OD_FINDINGS: FULL
OS_FINDINGS: FULL

## 2024-09-19 ASSESSMENT — KERATOMETRY
OD_K2POWER_DIOPTERS: 44.50
OD_AXISANGLE_DEGREES: 074
OS_AXISANGLE_DEGREES: 090
OD_K1POWER_DIOPTERS: 44.25
OS_K1POWER_DIOPTERS: 45.00
OS_K2POWER_DIOPTERS: 45.50

## 2024-09-24 ENCOUNTER — APPOINTMENT (OUTPATIENT)
Dept: MRI IMAGING | Facility: CLINIC | Age: 74
End: 2024-09-24
Payer: MEDICARE

## 2024-09-24 PROCEDURE — 70553 MRI BRAIN STEM W/O & W/DYE: CPT | Mod: MH

## 2024-09-24 PROCEDURE — A9585: CPT

## 2024-11-20 ENCOUNTER — INPATIENT (INPATIENT)
Facility: HOSPITAL | Age: 74
LOS: 2 days | Discharge: NURSING, REHAB OR LONG-TERM CARE | DRG: 92 | End: 2024-11-23
Attending: STUDENT IN AN ORGANIZED HEALTH CARE EDUCATION/TRAINING PROGRAM | Admitting: INTERNAL MEDICINE
Payer: MEDICARE

## 2024-11-20 VITALS
HEART RATE: 110 BPM | SYSTOLIC BLOOD PRESSURE: 163 MMHG | RESPIRATION RATE: 18 BRPM | WEIGHT: 164.91 LBS | TEMPERATURE: 98 F | DIASTOLIC BLOOD PRESSURE: 95 MMHG | OXYGEN SATURATION: 93 %

## 2024-11-20 DIAGNOSIS — Z90.89 ACQUIRED ABSENCE OF OTHER ORGANS: Chronic | ICD-10-CM

## 2024-11-20 DIAGNOSIS — G93.40 ENCEPHALOPATHY, UNSPECIFIED: ICD-10-CM

## 2024-11-20 DIAGNOSIS — R03.0 ELEVATED BLOOD-PRESSURE READING, WITHOUT DIAGNOSIS OF HYPERTENSION: ICD-10-CM

## 2024-11-20 DIAGNOSIS — E83.39 OTHER DISORDERS OF PHOSPHORUS METABOLISM: ICD-10-CM

## 2024-11-20 DIAGNOSIS — D72.829 ELEVATED WHITE BLOOD CELL COUNT, UNSPECIFIED: ICD-10-CM

## 2024-11-20 DIAGNOSIS — Z78.9 OTHER SPECIFIED HEALTH STATUS: ICD-10-CM

## 2024-11-20 DIAGNOSIS — Z98.49 CATARACT EXTRACTION STATUS, UNSPECIFIED EYE: Chronic | ICD-10-CM

## 2024-11-20 DIAGNOSIS — Z29.9 ENCOUNTER FOR PROPHYLACTIC MEASURES, UNSPECIFIED: ICD-10-CM

## 2024-11-20 LAB
ALBUMIN SERPL ELPH-MCNC: 4.6 G/DL — SIGNIFICANT CHANGE UP (ref 3.3–5)
ALP SERPL-CCNC: 109 U/L — SIGNIFICANT CHANGE UP (ref 40–120)
ALT FLD-CCNC: 20 U/L — SIGNIFICANT CHANGE UP (ref 10–45)
ANION GAP SERPL CALC-SCNC: 14 MMOL/L — SIGNIFICANT CHANGE UP (ref 5–17)
APPEARANCE UR: CLEAR — SIGNIFICANT CHANGE UP
AST SERPL-CCNC: 24 U/L — SIGNIFICANT CHANGE UP (ref 10–40)
BASOPHILS # BLD AUTO: 0.04 K/UL — SIGNIFICANT CHANGE UP (ref 0–0.2)
BASOPHILS NFR BLD AUTO: 0.3 % — SIGNIFICANT CHANGE UP (ref 0–2)
BILIRUB SERPL-MCNC: 0.6 MG/DL — SIGNIFICANT CHANGE UP (ref 0.2–1.2)
BILIRUB UR-MCNC: NEGATIVE — SIGNIFICANT CHANGE UP
BUN SERPL-MCNC: 13 MG/DL — SIGNIFICANT CHANGE UP (ref 7–23)
CALCIUM SERPL-MCNC: 9.3 MG/DL — SIGNIFICANT CHANGE UP (ref 8.4–10.5)
CHLORIDE SERPL-SCNC: 107 MMOL/L — SIGNIFICANT CHANGE UP (ref 96–108)
CK SERPL-CCNC: 415 U/L — HIGH (ref 25–170)
CO2 SERPL-SCNC: 22 MMOL/L — SIGNIFICANT CHANGE UP (ref 22–31)
COLOR SPEC: YELLOW — SIGNIFICANT CHANGE UP
CREAT SERPL-MCNC: 0.62 MG/DL — SIGNIFICANT CHANGE UP (ref 0.5–1.3)
DIFF PNL FLD: NEGATIVE — SIGNIFICANT CHANGE UP
EGFR: 92 ML/MIN/1.73M2 — SIGNIFICANT CHANGE UP
EGFR: 92 ML/MIN/1.73M2 — SIGNIFICANT CHANGE UP
EOSINOPHIL # BLD AUTO: 0 K/UL — SIGNIFICANT CHANGE UP (ref 0–0.5)
EOSINOPHIL NFR BLD AUTO: 0 % — SIGNIFICANT CHANGE UP (ref 0–6)
GLUCOSE SERPL-MCNC: 107 MG/DL — HIGH (ref 70–99)
GLUCOSE UR QL: NEGATIVE MG/DL — SIGNIFICANT CHANGE UP
HCT VFR BLD CALC: 43.7 % — SIGNIFICANT CHANGE UP (ref 34.5–45)
HGB BLD-MCNC: 15.3 G/DL — SIGNIFICANT CHANGE UP (ref 11.5–15.5)
IMM GRANULOCYTES NFR BLD AUTO: 0.6 % — SIGNIFICANT CHANGE UP (ref 0–0.9)
KETONES UR-MCNC: 40 MG/DL
LEUKOCYTE ESTERASE UR-ACNC: NEGATIVE — SIGNIFICANT CHANGE UP
LIDOCAIN IGE QN: 51 U/L — SIGNIFICANT CHANGE UP (ref 7–60)
LYMPHOCYTES # BLD AUTO: 1.31 K/UL — SIGNIFICANT CHANGE UP (ref 1–3.3)
LYMPHOCYTES # BLD AUTO: 10.9 % — LOW (ref 13–44)
MAGNESIUM SERPL-MCNC: 2 MG/DL — SIGNIFICANT CHANGE UP (ref 1.6–2.6)
MCHC RBC-ENTMCNC: 32.2 PG — SIGNIFICANT CHANGE UP (ref 27–34)
MCHC RBC-ENTMCNC: 35 G/DL — SIGNIFICANT CHANGE UP (ref 32–36)
MCV RBC AUTO: 92 FL — SIGNIFICANT CHANGE UP (ref 80–100)
MONOCYTES # BLD AUTO: 1.07 K/UL — HIGH (ref 0–0.9)
MONOCYTES NFR BLD AUTO: 8.9 % — SIGNIFICANT CHANGE UP (ref 2–14)
NEUTROPHILS # BLD AUTO: 9.48 K/UL — HIGH (ref 1.8–7.4)
NEUTROPHILS NFR BLD AUTO: 79.3 % — HIGH (ref 43–77)
NITRITE UR-MCNC: NEGATIVE — SIGNIFICANT CHANGE UP
NRBC # BLD: 0 /100 WBCS — SIGNIFICANT CHANGE UP (ref 0–0)
NRBC BLD-RTO: 0 /100 WBCS — SIGNIFICANT CHANGE UP (ref 0–0)
PH UR: 6 — SIGNIFICANT CHANGE UP (ref 5–8)
PHOSPHATE SERPL-MCNC: 2.2 MG/DL — LOW (ref 2.5–4.5)
PLATELET # BLD AUTO: 287 K/UL — SIGNIFICANT CHANGE UP (ref 150–400)
POTASSIUM SERPL-MCNC: 3.6 MMOL/L — SIGNIFICANT CHANGE UP (ref 3.5–5.3)
POTASSIUM SERPL-SCNC: 3.6 MMOL/L — SIGNIFICANT CHANGE UP (ref 3.5–5.3)
PROT SERPL-MCNC: 7.3 G/DL — SIGNIFICANT CHANGE UP (ref 6–8.3)
PROT UR-MCNC: 30 MG/DL
RBC # BLD: 4.75 M/UL — SIGNIFICANT CHANGE UP (ref 3.8–5.2)
RBC # FLD: 12.8 % — SIGNIFICANT CHANGE UP (ref 10.3–14.5)
SODIUM SERPL-SCNC: 143 MMOL/L — SIGNIFICANT CHANGE UP (ref 135–145)
SP GR SPEC: 1.02 — SIGNIFICANT CHANGE UP (ref 1–1.03)
TROPONIN T, HIGH SENSITIVITY RESULT: 12 NG/L — SIGNIFICANT CHANGE UP (ref 0–51)
UROBILINOGEN FLD QL: 0.2 MG/DL — SIGNIFICANT CHANGE UP (ref 0.2–1)
WBC # BLD: 11.97 K/UL — HIGH (ref 3.8–10.5)
WBC # FLD AUTO: 11.97 K/UL — HIGH (ref 3.8–10.5)

## 2024-11-20 PROCEDURE — 71045 X-RAY EXAM CHEST 1 VIEW: CPT | Mod: 26

## 2024-11-20 PROCEDURE — 99285 EMERGENCY DEPT VISIT HI MDM: CPT

## 2024-11-20 PROCEDURE — 99223 1ST HOSP IP/OBS HIGH 75: CPT

## 2024-11-20 PROCEDURE — 70450 CT HEAD/BRAIN W/O DYE: CPT | Mod: 26,MC

## 2024-11-20 RX ORDER — MAGNESIUM, ALUMINUM HYDROXIDE 200-200 MG
30 TABLET,CHEWABLE ORAL EVERY 4 HOURS
Refills: 0 | Status: DISCONTINUED | OUTPATIENT
Start: 2024-11-20 | End: 2024-11-23

## 2024-11-20 RX ORDER — MELATONIN 5 MG
3 TABLET ORAL AT BEDTIME
Refills: 0 | Status: DISCONTINUED | OUTPATIENT
Start: 2024-11-20 | End: 2024-11-23

## 2024-11-20 RX ORDER — POTASSIUM PHOSPHATE, MONOBASIC POTASSIUM PHOSPHATE, DIBASIC INJECTION, 236; 224 MG/ML; MG/ML
30 SOLUTION, CONCENTRATE INTRAVENOUS ONCE
Refills: 0 | Status: COMPLETED | OUTPATIENT
Start: 2024-11-20 | End: 2024-11-20

## 2024-11-20 RX ORDER — ONDANSETRON HCL/PF 4 MG/2 ML
4 VIAL (ML) INJECTION EVERY 8 HOURS
Refills: 0 | Status: DISCONTINUED | OUTPATIENT
Start: 2024-11-20 | End: 2024-11-23

## 2024-11-20 RX ORDER — SOD PHOS DI, MONO/K PHOS MONO 250 MG
1 TABLET ORAL ONCE
Refills: 0 | Status: COMPLETED | OUTPATIENT
Start: 2024-11-20 | End: 2024-11-20

## 2024-11-20 RX ORDER — ACETAMINOPHEN 500 MG/5ML
650 LIQUID (ML) ORAL EVERY 6 HOURS
Refills: 0 | Status: DISCONTINUED | OUTPATIENT
Start: 2024-11-20 | End: 2024-11-23

## 2024-11-20 RX ORDER — B1/B2/B3/B5/B6/B12/VIT C/FOLIC 500-0.5 MG
1 TABLET ORAL DAILY
Refills: 0 | Status: DISCONTINUED | OUTPATIENT
Start: 2024-11-20 | End: 2024-11-23

## 2024-11-20 RX ORDER — ENOXAPARIN SODIUM 100 MG/ML
40 INJECTION SUBCUTANEOUS EVERY 24 HOURS
Refills: 0 | Status: DISCONTINUED | OUTPATIENT
Start: 2024-11-20 | End: 2024-11-23

## 2024-11-20 RX ADMIN — Medication 1 PACKET(S): at 19:23

## 2024-11-21 LAB
A1C WITH ESTIMATED AVERAGE GLUCOSE RESULT: 5.5 % — SIGNIFICANT CHANGE UP (ref 4–5.6)
ALBUMIN SERPL ELPH-MCNC: 4.1 G/DL — SIGNIFICANT CHANGE UP (ref 3.3–5)
ALP SERPL-CCNC: 99 U/L — SIGNIFICANT CHANGE UP (ref 40–120)
ALT FLD-CCNC: 18 U/L — SIGNIFICANT CHANGE UP (ref 10–45)
AMMONIA BLD-MCNC: <10 UMOL/L — LOW (ref 11–55)
ANION GAP SERPL CALC-SCNC: 15 MMOL/L — SIGNIFICANT CHANGE UP (ref 5–17)
AST SERPL-CCNC: 24 U/L — SIGNIFICANT CHANGE UP (ref 10–40)
BASOPHILS # BLD AUTO: 0.03 K/UL — SIGNIFICANT CHANGE UP (ref 0–0.2)
BASOPHILS NFR BLD AUTO: 0.3 % — SIGNIFICANT CHANGE UP (ref 0–2)
BILIRUB SERPL-MCNC: 0.7 MG/DL — SIGNIFICANT CHANGE UP (ref 0.2–1.2)
BUN SERPL-MCNC: 12 MG/DL — SIGNIFICANT CHANGE UP (ref 7–23)
CALCIUM SERPL-MCNC: 8.4 MG/DL — SIGNIFICANT CHANGE UP (ref 8.4–10.5)
CHLORIDE SERPL-SCNC: 106 MMOL/L — SIGNIFICANT CHANGE UP (ref 96–108)
CHOLEST SERPL-MCNC: 226 MG/DL — HIGH
CK SERPL-CCNC: 217 U/L — HIGH (ref 25–170)
CK SERPL-CCNC: 452 U/L — HIGH (ref 25–170)
CO2 SERPL-SCNC: 22 MMOL/L — SIGNIFICANT CHANGE UP (ref 22–31)
CREAT SERPL-MCNC: 0.65 MG/DL — SIGNIFICANT CHANGE UP (ref 0.5–1.3)
EGFR: 91 ML/MIN/1.73M2 — SIGNIFICANT CHANGE UP
EGFR: 91 ML/MIN/1.73M2 — SIGNIFICANT CHANGE UP
EOSINOPHIL # BLD AUTO: 0.01 K/UL — SIGNIFICANT CHANGE UP (ref 0–0.5)
EOSINOPHIL NFR BLD AUTO: 0.1 % — SIGNIFICANT CHANGE UP (ref 0–6)
ESTIMATED AVERAGE GLUCOSE: 111 MG/DL — SIGNIFICANT CHANGE UP (ref 68–114)
ETHANOL SERPL-MCNC: <10 MG/DL — SIGNIFICANT CHANGE UP (ref 0–10)
GLUCOSE SERPL-MCNC: 112 MG/DL — HIGH (ref 70–99)
HCT VFR BLD CALC: 41.6 % — SIGNIFICANT CHANGE UP (ref 34.5–45)
HDLC SERPL-MCNC: 80 MG/DL — SIGNIFICANT CHANGE UP
HGB BLD-MCNC: 14.1 G/DL — SIGNIFICANT CHANGE UP (ref 11.5–15.5)
IMM GRANULOCYTES NFR BLD AUTO: 0.5 % — SIGNIFICANT CHANGE UP (ref 0–0.9)
LDLC SERPL-MCNC: 127 MG/DL — HIGH
LIPID PNL WITH DIRECT LDL SERPL: 127 MG/DL — HIGH
LYMPHOCYTES # BLD AUTO: 1.9 K/UL — SIGNIFICANT CHANGE UP (ref 1–3.3)
LYMPHOCYTES # BLD AUTO: 18.7 % — SIGNIFICANT CHANGE UP (ref 13–44)
MAGNESIUM SERPL-MCNC: 2 MG/DL — SIGNIFICANT CHANGE UP (ref 1.6–2.6)
MCHC RBC-ENTMCNC: 32.2 PG — SIGNIFICANT CHANGE UP (ref 27–34)
MCHC RBC-ENTMCNC: 33.9 G/DL — SIGNIFICANT CHANGE UP (ref 32–36)
MCV RBC AUTO: 95 FL — SIGNIFICANT CHANGE UP (ref 80–100)
MONOCYTES # BLD AUTO: 1 K/UL — HIGH (ref 0–0.9)
MONOCYTES NFR BLD AUTO: 9.8 % — SIGNIFICANT CHANGE UP (ref 2–14)
NEUTROPHILS # BLD AUTO: 7.17 K/UL — SIGNIFICANT CHANGE UP (ref 1.8–7.4)
NEUTROPHILS NFR BLD AUTO: 70.6 % — SIGNIFICANT CHANGE UP (ref 43–77)
NONHDLC SERPL-MCNC: 146 MG/DL — HIGH
NRBC # BLD: 0 /100 WBCS — SIGNIFICANT CHANGE UP (ref 0–0)
NRBC BLD-RTO: 0 /100 WBCS — SIGNIFICANT CHANGE UP (ref 0–0)
PHOSPHATE SERPL-MCNC: 4 MG/DL — SIGNIFICANT CHANGE UP (ref 2.5–4.5)
PLATELET # BLD AUTO: 269 K/UL — SIGNIFICANT CHANGE UP (ref 150–400)
POTASSIUM SERPL-MCNC: 3.6 MMOL/L — SIGNIFICANT CHANGE UP (ref 3.5–5.3)
POTASSIUM SERPL-SCNC: 3.6 MMOL/L — SIGNIFICANT CHANGE UP (ref 3.5–5.3)
PROT SERPL-MCNC: 6.7 G/DL — SIGNIFICANT CHANGE UP (ref 6–8.3)
RBC # BLD: 4.38 M/UL — SIGNIFICANT CHANGE UP (ref 3.8–5.2)
RBC # FLD: 12.9 % — SIGNIFICANT CHANGE UP (ref 10.3–14.5)
SODIUM SERPL-SCNC: 143 MMOL/L — SIGNIFICANT CHANGE UP (ref 135–145)
T PALLIDUM AB TITR SER: NEGATIVE — SIGNIFICANT CHANGE UP
TRIGL SERPL-MCNC: 111 MG/DL — SIGNIFICANT CHANGE UP
TSH SERPL-MCNC: 2.75 UIU/ML — SIGNIFICANT CHANGE UP (ref 0.27–4.2)
VIT B12 SERPL-MCNC: 630 PG/ML — SIGNIFICANT CHANGE UP (ref 232–1245)
WBC # BLD: 10.16 K/UL — SIGNIFICANT CHANGE UP (ref 3.8–10.5)
WBC # FLD AUTO: 10.16 K/UL — SIGNIFICANT CHANGE UP (ref 3.8–10.5)

## 2024-11-21 PROCEDURE — 99233 SBSQ HOSP IP/OBS HIGH 50: CPT | Mod: GC

## 2024-11-21 PROCEDURE — 95718 EEG PHYS/QHP 2-12 HR W/VEEG: CPT

## 2024-11-21 PROCEDURE — 70496 CT ANGIOGRAPHY HEAD: CPT | Mod: 26

## 2024-11-21 PROCEDURE — 70498 CT ANGIOGRAPHY NECK: CPT | Mod: 26

## 2024-11-21 RX ADMIN — Medication 1 TABLET(S): at 12:19

## 2024-11-21 RX ADMIN — POTASSIUM PHOSPHATE, MONOBASIC POTASSIUM PHOSPHATE, DIBASIC INJECTION, 83.33 MILLIMOLE(S): 236; 224 SOLUTION, CONCENTRATE INTRAVENOUS at 00:01

## 2024-11-21 RX ADMIN — ENOXAPARIN SODIUM 40 MILLIGRAM(S): 100 INJECTION SUBCUTANEOUS at 05:47

## 2024-11-21 RX ADMIN — Medication 100 MILLIGRAM(S): at 12:20

## 2024-11-21 RX ADMIN — Medication 166.67 MILLILITER(S): at 10:36

## 2024-11-22 DIAGNOSIS — R41.89 OTHER SYMPTOMS AND SIGNS INVOLVING COGNITIVE FUNCTIONS AND AWARENESS: ICD-10-CM

## 2024-11-22 DIAGNOSIS — R41.82 ALTERED MENTAL STATUS, UNSPECIFIED: ICD-10-CM

## 2024-11-22 DIAGNOSIS — R29.6 REPEATED FALLS: ICD-10-CM

## 2024-11-22 PROCEDURE — 99233 SBSQ HOSP IP/OBS HIGH 50: CPT | Mod: GC

## 2024-11-22 PROCEDURE — 99232 SBSQ HOSP IP/OBS MODERATE 35: CPT

## 2024-11-22 PROCEDURE — 95720 EEG PHY/QHP EA INCR W/VEEG: CPT

## 2024-11-22 RX ADMIN — Medication 1 TABLET(S): at 12:21

## 2024-11-22 RX ADMIN — Medication 100 MILLIGRAM(S): at 12:21

## 2024-11-23 ENCOUNTER — TRANSCRIPTION ENCOUNTER (OUTPATIENT)
Age: 74
End: 2024-11-23

## 2024-11-23 VITALS
RESPIRATION RATE: 17 BRPM | SYSTOLIC BLOOD PRESSURE: 147 MMHG | HEART RATE: 89 BPM | TEMPERATURE: 98 F | OXYGEN SATURATION: 96 % | DIASTOLIC BLOOD PRESSURE: 85 MMHG

## 2024-11-23 PROCEDURE — 82550 ASSAY OF CK (CPK): CPT

## 2024-11-23 PROCEDURE — 81001 URINALYSIS AUTO W/SCOPE: CPT

## 2024-11-23 PROCEDURE — 83735 ASSAY OF MAGNESIUM: CPT

## 2024-11-23 PROCEDURE — 97165 OT EVAL LOW COMPLEX 30 MIN: CPT

## 2024-11-23 PROCEDURE — 84100 ASSAY OF PHOSPHORUS: CPT

## 2024-11-23 PROCEDURE — 95705 EEG W/O VID 2-12 HR UNMNTR: CPT

## 2024-11-23 PROCEDURE — 82607 VITAMIN B-12: CPT

## 2024-11-23 PROCEDURE — 70498 CT ANGIOGRAPHY NECK: CPT | Mod: MC

## 2024-11-23 PROCEDURE — 99239 HOSP IP/OBS DSCHRG MGMT >30: CPT

## 2024-11-23 PROCEDURE — 97161 PT EVAL LOW COMPLEX 20 MIN: CPT

## 2024-11-23 PROCEDURE — 83690 ASSAY OF LIPASE: CPT

## 2024-11-23 PROCEDURE — 84443 ASSAY THYROID STIM HORMONE: CPT

## 2024-11-23 PROCEDURE — 97530 THERAPEUTIC ACTIVITIES: CPT

## 2024-11-23 PROCEDURE — 80061 LIPID PANEL: CPT

## 2024-11-23 PROCEDURE — 70496 CT ANGIOGRAPHY HEAD: CPT | Mod: MC

## 2024-11-23 PROCEDURE — 99285 EMERGENCY DEPT VISIT HI MDM: CPT | Mod: 25

## 2024-11-23 PROCEDURE — 70450 CT HEAD/BRAIN W/O DYE: CPT | Mod: MC

## 2024-11-23 PROCEDURE — 36415 COLL VENOUS BLD VENIPUNCTURE: CPT

## 2024-11-23 PROCEDURE — 95700 EEG CONT REC W/VID EEG TECH: CPT

## 2024-11-23 PROCEDURE — 82140 ASSAY OF AMMONIA: CPT

## 2024-11-23 PROCEDURE — A9585: CPT

## 2024-11-23 PROCEDURE — 70546 MR ANGIOGRAPH HEAD W/O&W/DYE: CPT | Mod: 26

## 2024-11-23 PROCEDURE — 85025 COMPLETE CBC W/AUTO DIFF WBC: CPT

## 2024-11-23 PROCEDURE — 95708 EEG WO VID EA 12-26HR UNMNTR: CPT

## 2024-11-23 PROCEDURE — 86780 TREPONEMA PALLIDUM: CPT

## 2024-11-23 PROCEDURE — 70546 MR ANGIOGRAPH HEAD W/O&W/DYE: CPT | Mod: MC

## 2024-11-23 PROCEDURE — 80053 COMPREHEN METABOLIC PANEL: CPT

## 2024-11-23 PROCEDURE — 84484 ASSAY OF TROPONIN QUANT: CPT

## 2024-11-23 PROCEDURE — 80307 DRUG TEST PRSMV CHEM ANLYZR: CPT

## 2024-11-23 PROCEDURE — 83036 HEMOGLOBIN GLYCOSYLATED A1C: CPT

## 2024-11-23 PROCEDURE — 71045 X-RAY EXAM CHEST 1 VIEW: CPT

## 2024-11-23 RX ADMIN — ENOXAPARIN SODIUM 40 MILLIGRAM(S): 100 INJECTION SUBCUTANEOUS at 05:32

## 2024-11-23 RX ADMIN — Medication 100 MILLIGRAM(S): at 14:12

## 2024-11-23 RX ADMIN — Medication 1 TABLET(S): at 14:11

## 2024-11-23 RX ADMIN — Medication 650 MILLIGRAM(S): at 14:12

## 2024-12-06 DIAGNOSIS — E83.39 OTHER DISORDERS OF PHOSPHORUS METABOLISM: ICD-10-CM

## 2024-12-06 DIAGNOSIS — Z97.4 PRESENCE OF EXTERNAL HEARING-AID: ICD-10-CM

## 2024-12-06 DIAGNOSIS — H90.3 SENSORINEURAL HEARING LOSS, BILATERAL: ICD-10-CM

## 2024-12-06 DIAGNOSIS — R26.81 UNSTEADINESS ON FEET: ICD-10-CM

## 2024-12-06 DIAGNOSIS — R47.01 APHASIA: ICD-10-CM

## 2024-12-06 DIAGNOSIS — Z91.81 HISTORY OF FALLING: ICD-10-CM

## 2024-12-06 DIAGNOSIS — F10.90 ALCOHOL USE, UNSPECIFIED, UNCOMPLICATED: ICD-10-CM

## 2024-12-06 DIAGNOSIS — D72.829 ELEVATED WHITE BLOOD CELL COUNT, UNSPECIFIED: ICD-10-CM

## 2024-12-06 DIAGNOSIS — Z96.1 PRESENCE OF INTRAOCULAR LENS: ICD-10-CM

## 2024-12-06 DIAGNOSIS — I67.6 NONPYOGENIC THROMBOSIS OF INTRACRANIAL VENOUS SYSTEM: ICD-10-CM
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
## 2024-12-06 DIAGNOSIS — F09 UNSPECIFIED MENTAL DISORDER DUE TO KNOWN PHYSIOLOGICAL CONDITION: ICD-10-CM

## 2024-12-06 DIAGNOSIS — I10 ESSENTIAL (PRIMARY) HYPERTENSION: ICD-10-CM

## 2024-12-06 DIAGNOSIS — Z98.41 CATARACT EXTRACTION STATUS, RIGHT EYE: ICD-10-CM

## 2024-12-06 DIAGNOSIS — Z88.0 ALLERGY STATUS TO PENICILLIN: ICD-10-CM

## 2024-12-06 DIAGNOSIS — Z28.21 IMMUNIZATION NOT CARRIED OUT BECAUSE OF PATIENT REFUSAL: ICD-10-CM

## 2024-12-06 DIAGNOSIS — Z98.42 CATARACT EXTRACTION STATUS, LEFT EYE: ICD-10-CM

## 2025-02-05 ENCOUNTER — APPOINTMENT (OUTPATIENT)
Dept: OTOLARYNGOLOGY | Facility: CLINIC | Age: 75
End: 2025-02-05

## 2025-07-28 ENCOUNTER — OFFICE (OUTPATIENT)
Dept: URBAN - METROPOLITAN AREA CLINIC 28 | Facility: CLINIC | Age: 75
Setting detail: OPHTHALMOLOGY
End: 2025-07-28
Payer: MEDICARE

## 2025-07-28 DIAGNOSIS — H01.002: ICD-10-CM

## 2025-07-28 DIAGNOSIS — H40.1131: ICD-10-CM

## 2025-07-28 DIAGNOSIS — H02.89: ICD-10-CM

## 2025-07-28 DIAGNOSIS — H01.001: ICD-10-CM

## 2025-07-28 DIAGNOSIS — H35.3132: ICD-10-CM

## 2025-07-28 DIAGNOSIS — H26.493: ICD-10-CM

## 2025-07-28 DIAGNOSIS — H01.004: ICD-10-CM

## 2025-07-28 DIAGNOSIS — H01.005: ICD-10-CM

## 2025-07-28 PROCEDURE — 92012 INTRM OPH EXAM EST PATIENT: CPT | Performed by: OPHTHALMOLOGY

## 2025-07-28 ASSESSMENT — KERATOMETRY
OD_AXISANGLE_DEGREES: 075
OS_K1POWER_DIOPTERS: 44.75
OS_K2POWER_DIOPTERS: 45.00
OS_AXISANGLE_DEGREES: 093
OD_K1POWER_DIOPTERS: 44.25
OD_K2POWER_DIOPTERS: 44.75

## 2025-07-28 ASSESSMENT — LID EXAM ASSESSMENTS
OD_BLEPHARITIS: 2+ 3+
OS_BLEPHARITIS: 2+ 3+
OD_COMMENTS: LID MARGIN TELANGIECTASIA
OS_COMMENTS: LID MARGIN TELANGIECTASIA
OD_MEIBOMITIS: 2+ 3+
OS_MEIBOMITIS: 2+ 3+

## 2025-07-28 ASSESSMENT — REFRACTION_AUTOREFRACTION
OS_AXIS: 035
OS_SPHERE: +0.25
OS_CYLINDER: -0.75
OD_SPHERE: -0.50
OD_CYLINDER: -0.25
OD_AXIS: 100

## 2025-07-28 ASSESSMENT — REFRACTION_CURRENTRX
OD_VPRISM_DIRECTION: SV
OD_AXIS: 180
OD_SPHERE: +1.00
OS_VPRISM_DIRECTION: SV
OS_OVR_VA: 20/
OS_AXIS: 180
OD_CYLINDER: 0.00
OS_CYLINDER: 0.00
OD_OVR_VA: 20/
OS_SPHERE: +1.00

## 2025-07-28 ASSESSMENT — VISUAL ACUITY
OD_BCVA: 20/20
OS_BCVA: 20/25+2

## 2025-07-28 ASSESSMENT — TONOMETRY
OD_IOP_MMHG: 20
OS_IOP_MMHG: 19

## 2025-07-28 ASSESSMENT — PACHYMETRY
OS_CT_CORRECTION: -1
OS_CT_UM: 562
OD_CT_UM: 561
OD_CT_CORRECTION: -1